# Patient Record
Sex: FEMALE | Race: OTHER | HISPANIC OR LATINO
[De-identification: names, ages, dates, MRNs, and addresses within clinical notes are randomized per-mention and may not be internally consistent; named-entity substitution may affect disease eponyms.]

---

## 2018-05-21 ENCOUNTER — RESULT REVIEW (OUTPATIENT)
Age: 48
End: 2018-05-21

## 2019-05-30 ENCOUNTER — RECORD ABSTRACTING (OUTPATIENT)
Age: 49
End: 2019-05-30

## 2019-05-30 DIAGNOSIS — Z78.9 OTHER SPECIFIED HEALTH STATUS: ICD-10-CM

## 2019-05-30 DIAGNOSIS — R35.1 NOCTURIA: ICD-10-CM

## 2019-05-30 PROBLEM — Z00.00 ENCOUNTER FOR PREVENTIVE HEALTH EXAMINATION: Status: ACTIVE | Noted: 2019-05-30

## 2019-05-30 LAB — CYTOLOGY CVX/VAG DOC THIN PREP: NORMAL

## 2019-06-03 ENCOUNTER — APPOINTMENT (OUTPATIENT)
Dept: OBGYN | Facility: CLINIC | Age: 49
End: 2019-06-03
Payer: COMMERCIAL

## 2019-06-03 VITALS
DIASTOLIC BLOOD PRESSURE: 78 MMHG | BODY MASS INDEX: 34.88 KG/M2 | HEIGHT: 59 IN | SYSTOLIC BLOOD PRESSURE: 116 MMHG | WEIGHT: 173 LBS

## 2019-06-03 DIAGNOSIS — Z87.42 PERSONAL HISTORY OF OTHER DISEASES OF THE FEMALE GENITAL TRACT: ICD-10-CM

## 2019-06-03 DIAGNOSIS — Z12.31 ENCOUNTER FOR SCREENING MAMMOGRAM FOR MALIGNANT NEOPLASM OF BREAST: ICD-10-CM

## 2019-06-03 DIAGNOSIS — Z01.411 ENCOUNTER FOR GYNECOLOGICAL EXAMINATION (GENERAL) (ROUTINE) WITH ABNORMAL FINDINGS: ICD-10-CM

## 2019-06-03 DIAGNOSIS — Z01.419 ENCOUNTER FOR GYNECOLOGICAL EXAMINATION (GENERAL) (ROUTINE) W/OUT ABNORMAL FINDINGS: ICD-10-CM

## 2019-06-03 LAB
BILIRUB UR QL STRIP: NORMAL
DATE COLLECTED: NORMAL
GLUCOSE UR-MCNC: NORMAL
HCG UR QL: 0.2 EU/DL
HEMOCCULT SP1 STL QL: NEGATIVE
HGB UR QL STRIP.AUTO: ABNORMAL
KETONES UR-MCNC: NORMAL
LEUKOCYTE ESTERASE UR QL STRIP: NORMAL
NITRITE UR QL STRIP: NORMAL
PH UR STRIP: 5
PROT UR STRIP-MCNC: NORMAL
QUALITY CONTROL: YES
SP GR UR STRIP: 1.02

## 2019-06-03 PROCEDURE — 99396 PREV VISIT EST AGE 40-64: CPT

## 2019-06-03 PROCEDURE — 81003 URINALYSIS AUTO W/O SCOPE: CPT | Mod: QW

## 2019-06-03 PROCEDURE — 82270 OCCULT BLOOD FECES: CPT

## 2019-06-04 NOTE — PHYSICAL EXAM
[Awake] : awake [Alert] : alert [Soft] : soft [Oriented x3] : oriented to person, place, and time [No Bleeding] : there was no active vaginal bleeding [No Tenderness] : no rectal tenderness [Uterine Adnexae] : were not tender and not enlarged [Normal rectal exam] : was normal [Normal Brown Stool] : was normal and brown [Normal] : was normal [None] : there was no rectal mass  [Acute Distress] : no acute distress [Mass] : no breast mass [Axillary LAD] : no axillary lymphadenopathy [Nipple Discharge] : no nipple discharge [Distended] : not distended [Tender] : non tender [Depressed Mood] : not depressed [Flat Affect] : affect not flat [Labia Minora] : labia minora [Atrophy] : atrophy [Labia Majora] : labia major [Tenderness] : nontender [Pap Obtained] : a Pap smear was performed [Occult Blood] : occult blood test from digital rectal exam was negative [External Hemorrhoid] : no external hemorrhoids were present [Internal Hemorrhoid] : no internal hemorrhoids were present [Skin Tags] : no residual hemorrhoidal skin tags

## 2019-06-04 NOTE — REVIEW OF SYSTEMS
[Nl] : Integumentary [Fever] : no fever [Chills] : no chills [Chest Pain] : no chest pain [Dyspnea] : no shortness of breath [Abdominal Pain] : no abdominal pain [Vomiting] : no vomiting [Pelvic Pain] : no pelvic pain [Frequency] : no frequency [Abn Vag Bleeding] : no abnormal vaginal bleeding

## 2019-06-04 NOTE — HISTORY OF PRESENT ILLNESS
[Good] : being in good health [Last Pap ___] : Last cervical pap smear was [unfilled] [Sexually Active] : is sexually active [Monogamous] : is monogamous [Male ___] : [unfilled] male [Definite:  ___ (Date)] : the last menstrual period was [unfilled] [Menarche Age: ____] : age at menarche was [unfilled] [Burning] : no burning [Itching] : no itching [Mass] : no mass [Stinging] : no stinging [Lesion] : no lesion [Discharge] : no discharge [Soreness] : no soreness [Contraception] : does not use contraception

## 2019-06-07 LAB
CYTOLOGY CVX/VAG DOC THIN PREP: NORMAL
HPV HIGH+LOW RISK DNA PNL CVX: NOT DETECTED

## 2020-12-21 PROBLEM — Z12.31 ENCOUNTER FOR SCREENING MAMMOGRAM FOR BREAST CANCER: Status: RESOLVED | Noted: 2019-06-03 | Resolved: 2020-12-21

## 2021-06-04 ENCOUNTER — EMERGENCY (EMERGENCY)
Facility: HOSPITAL | Age: 51
LOS: 1 days | End: 2021-06-04
Attending: EMERGENCY MEDICINE
Payer: COMMERCIAL

## 2021-06-04 VITALS
HEART RATE: 91 BPM | WEIGHT: 169.98 LBS | RESPIRATION RATE: 18 BRPM | SYSTOLIC BLOOD PRESSURE: 132 MMHG | OXYGEN SATURATION: 98 % | TEMPERATURE: 99 F | DIASTOLIC BLOOD PRESSURE: 87 MMHG | HEIGHT: 59 IN

## 2021-06-04 PROCEDURE — 73030 X-RAY EXAM OF SHOULDER: CPT

## 2021-06-04 PROCEDURE — 99053 MED SERV 10PM-8AM 24 HR FAC: CPT

## 2021-06-04 PROCEDURE — 99283 EMERGENCY DEPT VISIT LOW MDM: CPT | Mod: 25

## 2021-06-04 PROCEDURE — 96372 THER/PROPH/DIAG INJ SC/IM: CPT

## 2021-06-04 PROCEDURE — 99284 EMERGENCY DEPT VISIT MOD MDM: CPT

## 2021-06-04 PROCEDURE — 73030 X-RAY EXAM OF SHOULDER: CPT | Mod: 26,LT

## 2021-06-04 RX ORDER — KETOROLAC TROMETHAMINE 30 MG/ML
15 SYRINGE (ML) INJECTION ONCE
Refills: 0 | Status: DISCONTINUED | OUTPATIENT
Start: 2021-06-04 | End: 2021-06-04

## 2021-06-04 RX ADMIN — Medication 15 MILLIGRAM(S): at 06:08

## 2021-06-04 NOTE — ED PROVIDER NOTE - PHYSICAL EXAMINATION
General-alert and oriented to person place and time, nontoxic appearing, pleasant cooperative, NAD  HEENT-normocephalic, atraumatic, NT to palp, EOMI, PERRLA, no conjunctival injections,   Cardio-s1,s2 present, regular rate and rhythm  Resp- talks in full sentences, symmetrical chest rise, CTA bilat,   Abdomen- bowel sounds presnt in all 4 quadrants, soft, NT/ND, no guarding, no rebound tenderness  MSK- Decreased range of motion of the left shoulder, unable to fully abduct the shoulder 2/2 pain, tender to deltoid muscle,  Back- nt to palp of cervical, thoracic, lumbar spine, nt to palp of paraspinal m., No CVA tenderness  Neuro- no focal deficits, sensation intact

## 2021-06-04 NOTE — ED PROVIDER NOTE - ATTENDING CONTRIBUTION TO CARE
Mirta: I performed a face to face bedside interview with patient regarding history of present illness, review of symptoms and past medical history. I completed an independent physical exam.  I have discussed patient's plan of care with advanced care provider.   I agree with note as stated above including HISTORY OF PRESENT ILLNESS, HIV, PAST MEDICAL/SURGICAL/FAMILY/SOCIAL HISTORY, ALLERGIES AND HOME MEDICATIONS, REVIEW OF SYSTEMS, PHYSICAL EXAM, MEDICAL DECISION MAKING and any PROGRESS NOTES during the time I functioned as the attending physician for this patient  unless otherwise noted. My brief assessment is as follows: 1 week left shoulder pain, worse when moving. uses arm a lot for lifting at  job. no other injury, no tingling/numbness. no cp. with mild ttp to shoulder, can range but with some pain, neurovacularly intact. suspect soft tissue inflammation, xray without fracture. supportive care.

## 2021-06-04 NOTE — ED PROVIDER NOTE - OBJECTIVE STATEMENT
49 y/o female with no sign medical history presents to the ED c/o worsening left arm pain. 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 Notes no specific injury that she can recall but notes worsening over time. Notes she was doing her exercises this morning and felt a worsening pain of the shoulder. Decreased range of motion of the left shoulder, notes she has difficulty ranging it over her head. Denies numbness, tingling, coldness, chest pain, sob, fevers

## 2021-06-04 NOTE — ED PROVIDER NOTE - CLINICAL SUMMARY MEDICAL DECISION MAKING FREE TEXT BOX
left shoulder pain likely from overuse, decreased range of moiton of the left shoulder unable to fully abduct over her head, tender to deltoid muscle, xray should no acute abnormalities, advised RICE therapy, Pt reassessed, pt feeling better at this time, vss, pt able to walk, talk and vocalized plan of action. Discussed in depth and explained to pt in depth the next steps that need to be taking including proper follow up with PCP or specialists. All incidental findings were discussed with pt as well. Pt verbalized their concerns and all questions were answered. Pt understands dispo and wants discharge. Given good instructions when to return to ED and importance of f/u.

## 2021-06-04 NOTE — ED ADULT TRIAGE NOTE - CHIEF COMPLAINT QUOTE
C/o left arm. Pt states, "I was doing my regular routine my exercise and it started hurting". Hx of gastritis.

## 2021-06-04 NOTE — ED PROVIDER NOTE - CARE PROVIDER_API CALL
Timothy Mendez)  Orthopaedic Surgery  [address], [address] [facility] [address], [address]  [place] [address]  Phone: [phone]  Fax: [phone]  Follow Up Time:

## 2021-06-04 NOTE — ED PROVIDER NOTE - PATIENT PORTAL LINK FT
You can access the FollowMyHealth Patient Portal offered by [facility] by registering at the following website: [email or web address]. By joining [facility]’s FollowMyHealth portal, you will also be able to view your health information using other applications (apps) compatible with our system.

## 2022-07-21 PROBLEM — Z78.9 OTHER SPECIFIED HEALTH STATUS: Chronic | Status: ACTIVE | Noted: 2021-06-04

## 2022-09-02 ENCOUNTER — APPOINTMENT (OUTPATIENT)
Dept: GASTROENTEROLOGY | Facility: CLINIC | Age: 52
End: 2022-09-02

## 2022-09-02 VITALS
RESPIRATION RATE: 14 BRPM | BODY MASS INDEX: 35.48 KG/M2 | WEIGHT: 176 LBS | HEART RATE: 84 BPM | SYSTOLIC BLOOD PRESSURE: 118 MMHG | DIASTOLIC BLOOD PRESSURE: 78 MMHG | HEIGHT: 59 IN | OXYGEN SATURATION: 98 %

## 2022-09-02 DIAGNOSIS — Z12.11 ENCOUNTER FOR SCREENING FOR MALIGNANT NEOPLASM OF COLON: ICD-10-CM

## 2022-09-02 PROCEDURE — 99204 OFFICE O/P NEW MOD 45 MIN: CPT

## 2022-09-02 RX ORDER — OMEPRAZOLE MAGNESIUM 10 MG/1
10 GRANULE, DELAYED RELEASE ORAL
Refills: 0 | Status: ACTIVE | COMMUNITY

## 2022-09-02 RX ORDER — POLYETHYLENE GLYCOL-3350, SODIUM CHLORIDE, POTASSIUM CHLORIDE AND SODIUM BICARBONATE 420; 11.2; 5.72; 1.48 G/438.4G; G/438.4G; G/438.4G; G/438.4G
420 POWDER, FOR SOLUTION ORAL
Qty: 1 | Refills: 0 | Status: ACTIVE | COMMUNITY
Start: 2022-09-02 | End: 1900-01-01

## 2022-09-03 NOTE — CONSULT LETTER
[Dear  ___] : Dear  [unfilled], [Consult Letter:] : I had the pleasure of evaluating your patient, [unfilled]. [Please see my note below.] : Please see my note below. [Consult Closing:] : Thank you very much for allowing me to participate in the care of this patient.  If you have any questions, please do not hesitate to contact me. [Sincerely,] : Sincerely, [FreeTextEntry1] : Average risk for development of colorectal neoplasia.  Chronic heartburn greater than 5 years but no upper GI alarm symptoms.  Greening endoscopy and colonoscopy arranged. [FreeTextEntry3] : Amish Valencia MD FACG\par Diplomate American Board of Internal Medicine and Gastroenterolgy\par [facility]\par

## 2022-09-03 NOTE — HISTORY OF PRESENT ILLNESS
[FreeTextEntry1] : 52-year-old white female with history of obesity BMI 34.  Non-English-speaking.  Maeve Cervantes MA, serves as .  Family history is negative for colorectal neoplasia.  Past surgical history remarkable for  x2, uncomplicated.  Only current medication is Prilosec for chronic heartburn.  Patient reports that heartburn occurs on a daily basis for multiple years probably greater than 5.  Denies having any dysphagia.  She does have nocturnal heartburn as well.  There is no dysphagia or odynophagia.  Denies having any abdominal pain or dyspepsia.  No prior investigation for heartburn.  Uses Prilosec on as needed basis.  Most of symptoms are relieved with use of PPI medication.  Medication side effect.\par Patient reports that bowel movements are occurring on a regular basis.  She denies having any abdominal pain weight loss or rectal bleeding.  No history of hemorrhoids.  No change in pattern of bowel movements.  Recent blood work from PCP done 2 months ago was not yet available for review.  We will review when available.\par No prior screening colonoscopy exam.

## 2022-09-03 NOTE — PHYSICAL EXAM
[General Appearance - Alert] : alert [General Appearance - In No Acute Distress] : in no acute distress [Sclera] : the sclera and conjunctiva were normal [PERRL With Normal Accommodation] : pupils were equal in size, round, and reactive to light [Extraocular Movements] : extraocular movements were intact [Outer Ear] : the ears and nose were normal in appearance [Oropharynx] : the oropharynx was normal [Neck Appearance] : the appearance of the neck was normal [Neck Cervical Mass (___cm)] : no neck mass was observed [Jugular Venous Distention Increased] : there was no jugular-venous distention [Thyroid Diffuse Enlargement] : the thyroid was not enlarged [Thyroid Nodule] : there were no palpable thyroid nodules [Auscultation Breath Sounds / Voice Sounds] : lungs were clear to auscultation bilaterally [Heart Rate And Rhythm] : heart rate was normal and rhythm regular [Heart Sounds] : normal S1 and S2 [Heart Sounds Gallop] : no gallops [Murmurs] : no murmurs [Heart Sounds Pericardial Friction Rub] : no pericardial rub [Bowel Sounds] : normal bowel sounds [Abdomen Soft] : soft [Abdomen Tenderness] : non-tender [Abdomen Mass (___ Cm)] : no abdominal mass palpated [FreeTextEntry1] : Transverse  scar.  No hernia.  No organomegaly.  No mass tenderness or rebound.  No distention.  Moderate obesity. [Cervical Lymph Nodes Enlarged Posterior Bilaterally] : posterior cervical [Cervical Lymph Nodes Enlarged Anterior Bilaterally] : anterior cervical [Supraclavicular Lymph Nodes Enlarged Bilaterally] : supraclavicular [Axillary Lymph Nodes Enlarged Bilaterally] : axillary [Femoral Lymph Nodes Enlarged Bilaterally] : femoral [Inguinal Lymph Nodes Enlarged Bilaterally] : inguinal [No CVA Tenderness] : no ~M costovertebral angle tenderness [No Spinal Tenderness] : no spinal tenderness [Abnormal Walk] : normal gait [Nail Clubbing] : no clubbing  or cyanosis of the fingernails [Musculoskeletal - Swelling] : no joint swelling seen [Motor Tone] : muscle strength and tone were normal [Skin Color & Pigmentation] : normal skin color and pigmentation [Skin Turgor] : normal skin turgor [] : no rash

## 2022-09-03 NOTE — ASSESSMENT
[FreeTextEntry1] : Family history is negative for colorectal neoplasia.  Physical exam is normal.  No GI alarm symptoms.  As such patient is at average risk for development of colorectal neoplasia.  For a screening colonoscopy was offered to the patient.  The procedure, its risk benefits and prep, were explained to the patient, who understands and is agreeable to proceed.  ASA #2.  Will review blood work from PCP once available.  Repeat blood work not necessary.  Gavel light prep to be utilized.  All on preparation day.\par Chronic heartburn greater than 5 years duration with fair control on as needed PPI medication.  No upper GI alarm symptoms.  Reasonable candidate for a screening endoscopy to exclude Leonard's esophagus.  The procedure, its risk benefits and prep, were explained to the patient, who understands and is agreeable to proceed.  ASA #2.  Both procedures will be performed on same day.  Mallampati #3.  Prilosec will be held for 2 weeks prior to the procedure.  Patient is in optimal medical condition to undergo both procedures.  No clearances required.  Arrangements made.  Results to follow.

## 2022-09-03 NOTE — REASON FOR VISIT
[Consultation] : a consultation visit [FreeTextEntry1] : Greening colonoscopy evaluation, chronic acid reflux.

## 2022-10-18 LAB — SARS-COV-2 N GENE NPH QL NAA+PROBE: NOT DETECTED

## 2022-10-19 ENCOUNTER — TRANSCRIPTION ENCOUNTER (OUTPATIENT)
Age: 52
End: 2022-10-19

## 2022-10-20 ENCOUNTER — RESULT REVIEW (OUTPATIENT)
Age: 52
End: 2022-10-20

## 2022-10-20 ENCOUNTER — APPOINTMENT (OUTPATIENT)
Dept: GASTROENTEROLOGY | Facility: GI CENTER | Age: 52
End: 2022-10-20

## 2022-10-20 ENCOUNTER — OUTPATIENT (OUTPATIENT)
Dept: OUTPATIENT SERVICES | Facility: HOSPITAL | Age: 52
LOS: 1 days | End: 2022-10-20
Payer: COMMERCIAL

## 2022-10-20 DIAGNOSIS — Z12.11 ENCOUNTER FOR SCREENING FOR MALIGNANT NEOPLASM OF COLON: ICD-10-CM

## 2022-10-20 DIAGNOSIS — K29.80 DUODENITIS W/OUT BLEEDING: ICD-10-CM

## 2022-10-20 DIAGNOSIS — K31.7 POLYP OF STOMACH AND DUODENUM: ICD-10-CM

## 2022-10-20 DIAGNOSIS — K64.4 RESIDUAL HEMORRHOIDAL SKIN TAGS: ICD-10-CM

## 2022-10-20 DIAGNOSIS — K63.89 OTHER SPECIFIED DISEASES OF INTESTINE: ICD-10-CM

## 2022-10-20 DIAGNOSIS — R12 HEARTBURN: ICD-10-CM

## 2022-10-20 DIAGNOSIS — K21.9 GASTRO-ESOPHAGEAL REFLUX DISEASE W/OUT ESOPHAGITIS: ICD-10-CM

## 2022-10-20 DIAGNOSIS — K21.9 GASTRO-ESOPHAGEAL REFLUX DISEASE WITHOUT ESOPHAGITIS: ICD-10-CM

## 2022-10-20 PROCEDURE — 45378 DIAGNOSTIC COLONOSCOPY: CPT | Mod: 33

## 2022-10-20 PROCEDURE — G0121: CPT

## 2022-10-20 PROCEDURE — 88305 TISSUE EXAM BY PATHOLOGIST: CPT

## 2022-10-20 PROCEDURE — 43239 EGD BIOPSY SINGLE/MULTIPLE: CPT | Mod: 59

## 2022-10-20 PROCEDURE — 88342 IMHCHEM/IMCYTCHM 1ST ANTB: CPT | Mod: 26

## 2022-10-20 PROCEDURE — 88342 IMHCHEM/IMCYTCHM 1ST ANTB: CPT

## 2022-10-20 PROCEDURE — 43239 EGD BIOPSY SINGLE/MULTIPLE: CPT

## 2022-10-20 PROCEDURE — 88305 TISSUE EXAM BY PATHOLOGIST: CPT | Mod: 26

## 2022-10-20 RX ORDER — PANTOPRAZOLE 40 MG/1
40 TABLET, DELAYED RELEASE ORAL
Qty: 1 | Refills: 3 | Status: ACTIVE | COMMUNITY
Start: 2022-10-20 | End: 1900-01-01

## 2022-10-20 NOTE — PHYSICAL EXAM
Is Phototherapy Contraindicated?: No Dupixent Dosing: 600 mg SC day 0 then 300 mg SC every other week [Alert] : alert Diagnosis (Required): Atopic Dermatitis/Eczematous Dermatitis [Normal Voice/Communication] : normal voice/communication Detail Level: Zone [Healthy Appearing] : healthy appearing Pregnancy And Lactation Warning Text: There have not been adverse fetal risks in women taking Dupixent while pregnant. It is unknown if this medication is excreted in breast milk. [No Acute Distress] : no acute distress Dupixent Monitoring Guidelines: There is no laboratory monitoring requirement with Dupixent. [Sclera] : the sclera and conjunctiva were normal [Hearing Threshold Finger Rub Not [place]] : hearing was normal [Normal Lips/Gums] : the lips and gums were normal [Oropharynx] : the oropharynx was normal [Normal Appearance] : the appearance of the neck was normal [No Neck Mass] : no neck mass was observed [No Respiratory Distress] : no respiratory distress [No Acc Muscle Use] : no accessory muscle use [Respiration, Rhythm And Depth] : normal respiratory rhythm and effort [Auscultation Breath Sounds / Voice Sounds] : lungs were clear to auscultation bilaterally [Heart Rate And Rhythm] : heart rate was normal and rhythm regular [Normal S1, S2] : normal S1 and S2 [Murmurs] : no murmurs [Normal] : normal bowel sounds, non-tender, no masses, soft, no no hepato-splenomegaly [Bowel Sounds] : normal bowel sounds [Abdomen Tenderness] : non-tender [No Masses] : no abdominal mass palpated [Abdomen Soft] : soft [] : no hepatosplenomegaly [Normal rectal exam] : was normal [Occult Blood Positive] : was negative for occult blood [Oriented To Time, Place, And Person] : oriented to person, place, and time [de-identified] : Old C Sectionn scars.  No HSM.  No MTR. No hernia.

## 2022-10-20 NOTE — HISTORY OF PRESENT ILLNESS
[FreeTextEntry1] : 51 yo Hisp F c OB, BMI 34; Chronic HB x yrs.  Mostly responsive to PPI meds.  No UGI alarm sx.  No prior EGD.  No hx of PUD.  Neg FH for CRCa.  No lower GI sx.  No recent BW from PCP.  No new medical issues.  \par Completed Gavilyte prep.

## 2022-10-26 LAB — SURGICAL PATHOLOGY STUDY: SIGNIFICANT CHANGE UP

## 2023-10-15 ENCOUNTER — TRANSCRIPTION ENCOUNTER (OUTPATIENT)
Age: 53
End: 2023-10-15

## 2025-08-14 DIAGNOSIS — R35.1 NOCTURIA: ICD-10-CM

## 2025-09-09 ENCOUNTER — APPOINTMENT (OUTPATIENT)
Dept: INTERNAL MEDICINE | Facility: CLINIC | Age: 55
End: 2025-09-09
Payer: COMMERCIAL

## 2025-09-09 VITALS
HEIGHT: 59 IN | DIASTOLIC BLOOD PRESSURE: 83 MMHG | SYSTOLIC BLOOD PRESSURE: 138 MMHG | BODY MASS INDEX: 36.08 KG/M2 | OXYGEN SATURATION: 97 % | HEART RATE: 70 BPM | WEIGHT: 179 LBS

## 2025-09-09 VITALS — SYSTOLIC BLOOD PRESSURE: 122 MMHG | DIASTOLIC BLOOD PRESSURE: 82 MMHG

## 2025-09-09 DIAGNOSIS — T78.40XA ALLERGY, UNSPECIFIED, INITIAL ENCOUNTER: ICD-10-CM

## 2025-09-09 DIAGNOSIS — Z00.00 ENCOUNTER FOR GENERAL ADULT MEDICAL EXAMINATION W/OUT ABNORMAL FINDINGS: ICD-10-CM

## 2025-09-09 DIAGNOSIS — Z23 ENCOUNTER FOR IMMUNIZATION: ICD-10-CM

## 2025-09-09 DIAGNOSIS — K21.9 GASTRO-ESOPHAGEAL REFLUX DISEASE W/OUT ESOPHAGITIS: ICD-10-CM

## 2025-09-09 DIAGNOSIS — K29.80 DUODENITIS W/OUT BLEEDING: ICD-10-CM

## 2025-09-09 PROCEDURE — G0008: CPT

## 2025-09-09 PROCEDURE — 90662 IIV NO PRSV INCREASED AG IM: CPT

## 2025-09-09 PROCEDURE — 99386 PREV VISIT NEW AGE 40-64: CPT | Mod: GC,25

## 2025-09-09 RX ORDER — CETIRIZINE HYDROCHLORIDE 10 MG/1
10 TABLET, COATED ORAL
Qty: 90 | Refills: 2 | Status: ACTIVE | COMMUNITY
Start: 2025-09-09 | End: 1900-01-01

## 2025-09-09 RX ORDER — PANTOPRAZOLE 40 MG/1
40 TABLET, DELAYED RELEASE ORAL
Qty: 30 | Refills: 3 | Status: ACTIVE | COMMUNITY
Start: 2025-09-09 | End: 1900-01-01

## (undated) DEVICE — CSC-COLONOSCOPE 2002772: Type: DURABLE MEDICAL EQUIPMENT

## (undated) DEVICE — STERIS DEFENDO 3-PIECE KIT (AIR/WATER, SUCTION & BIOPSY VALVES)

## (undated) DEVICE — FORCEP ENDOJAW AGTR LC W NDL 2.8MM 230CM DISP